# Patient Record
Sex: FEMALE | Race: WHITE | NOT HISPANIC OR LATINO | URBAN - METROPOLITAN AREA
[De-identification: names, ages, dates, MRNs, and addresses within clinical notes are randomized per-mention and may not be internally consistent; named-entity substitution may affect disease eponyms.]

---

## 2020-08-28 ENCOUNTER — TELEHEALTH PROVIDED OTHER THAN IN PATIENT'S HOME (OUTPATIENT)
Dept: URBAN - METROPOLITAN AREA TELEHEALTH 7 | Facility: TELEHEALTH | Age: 30
End: 2020-08-28
Payer: OTHER GOVERNMENT

## 2020-08-28 VITALS — WEIGHT: 130 LBS | HEIGHT: 65 IN

## 2020-08-28 DIAGNOSIS — K92.1 MELENA: ICD-10-CM

## 2020-08-28 DIAGNOSIS — R19.03 RIGHT LOWER QUADRANT ABDOMINAL SWELLING, MASS AND LUMP: ICD-10-CM

## 2020-08-28 PROCEDURE — 99204 OFFICE O/P NEW MOD 45 MIN: CPT | Mod: GT | Performed by: INTERNAL MEDICINE

## 2020-08-28 NOTE — SERVICEHPINOTES
PATIENT VERIFIED BY DATE OF BIRTH AND NAME. Patient has been consented for this telecommunication visit. She notes intermittent rectal bleeding over the past 3 years. She states episodes are usually once a week and consist of BM sometimes with blood streaks on it as well as blood in the toilet bowl and on toilet paper.  She occasionally notes RLQ swelling which is not painful but does not seem to be related to any other GI issues. She often notes bloating and can have episodes of constipation (defined as trouble defecating) a couple of times a month. Otherwise she usually has regular BMs. She denies any abdominal pain, nausea, vomiting, diarrhea, melena, loss of appetite, weight loss, heartburn/reflux, dysphagia. She saw GI in the past and was supposed to get colonoscopy but moved before she could get the procedure.    She notes maternal uncle with colon cancer who is 60+. No family history of IBD. BR